# Patient Record
Sex: MALE | Race: WHITE | NOT HISPANIC OR LATINO | Employment: FULL TIME | URBAN - NONMETROPOLITAN AREA
[De-identification: names, ages, dates, MRNs, and addresses within clinical notes are randomized per-mention and may not be internally consistent; named-entity substitution may affect disease eponyms.]

---

## 2017-03-28 ENCOUNTER — OFFICE VISIT (OUTPATIENT)
Dept: FAMILY MEDICINE CLINIC | Facility: CLINIC | Age: 32
End: 2017-03-28

## 2017-03-28 VITALS
RESPIRATION RATE: 15 BRPM | SYSTOLIC BLOOD PRESSURE: 116 MMHG | HEIGHT: 73 IN | TEMPERATURE: 98.3 F | HEART RATE: 79 BPM | BODY MASS INDEX: 22.13 KG/M2 | OXYGEN SATURATION: 100 % | WEIGHT: 167 LBS | DIASTOLIC BLOOD PRESSURE: 77 MMHG

## 2017-03-28 DIAGNOSIS — G47.34 NOCTURNAL HYPOXEMIA: ICD-10-CM

## 2017-03-28 DIAGNOSIS — N52.8 OTHER MALE ERECTILE DYSFUNCTION: Primary | ICD-10-CM

## 2017-03-28 DIAGNOSIS — Z00.00 HEALTH CARE MAINTENANCE: ICD-10-CM

## 2017-03-28 DIAGNOSIS — R53.82 CHRONIC FATIGUE: ICD-10-CM

## 2017-03-28 DIAGNOSIS — Z87.891 FORMER SMOKER, STOPPED SMOKING IN DISTANT PAST: ICD-10-CM

## 2017-03-28 PROCEDURE — 99204 OFFICE O/P NEW MOD 45 MIN: CPT | Performed by: INTERNAL MEDICINE

## 2017-03-28 RX ORDER — TRIAMCINOLONE ACETONIDE 55 UG/1
2 SPRAY, METERED NASAL DAILY
COMMUNITY

## 2017-03-28 NOTE — PATIENT INSTRUCTIONS
Chronic Fatigue Syndrome  Chronic fatigue syndrome (CFS) is a condition in which there is lasting, extreme tiredness (fatigue) that does not improve with rest. CFS affects women up to four times more often than men. If you have CFS, fatigue and other symptoms can make it hard for you to get through your day. There is no treatment or cure. You will need to work closely with your health care provider to come up with a treatment plan that works for you.  CAUSES   No one knows what causes CFS. It may be triggered by a flu-like illness or by mono. Other triggers may include:  · An abnormal immune system.  · Low blood pressure.  · Poor diet.  · Physical or emotional stress.  SIGNS AND SYMPTOMS  The main symptom is fatigue that lasts all day, especially after physical or mental stress. Other common symptoms include:  · An extreme loss of energy with no obvious cause.  · Muscle or joint soreness.  · Severe weakness.  · Frequent headaches.  · Fever.  · Sore throat.  · Swollen lymph glands.  · Sleep is not refreshing.  · Loss of concentration or memory.  Less common symptoms may include:  · Chills.  · Night sweats.  · Tingling or numbness.  · Blurred vision.  · Dizziness.  · Sensitivity to noise or odors.  · Mood swings.  · Anxiety, panic attacks, and depression.  Your symptoms may come and go, or you may have them all the time.  DIAGNOSIS   There are no tests that can help health care providers diagnose CFS. It may take a long time for you to get a correct diagnosis. Your health care provider may need to do a number of tests to rule out other conditions that could be causing your symptoms.  You may be diagnosed with CFS if:  · You have fatigue that has lasted for at least six months.  · Your fatigue is not relieved by rest.  · Your fatigue is not caused by another condition.  · Your fatigue is severe enough to interfere with work and daily activities.  · You have at least four common symptoms of CFS.  TREATMENT   There is no  cure for CFS at this time. The condition affects everyone differently. You will need to work with your health care provider to find the best treatment for your symptoms.  Treatment may include:  · Improving sleep with a regular bedtime routine.  · Avoiding caffeine, alcohol, and tobacco.  · Doing light exercise and stretching during the day.  · Taking medicine to help you sleep.  · Taking over-the-counter medicines to relieve joint or muscle pain.  · Learning and practicing relaxation techniques.  · Using memory aids or doing brain teasers to improve memory and concentration.  · Seeing a mental health professional to evaluate and treat depression, if necessary.  · Trying massage therapy, acupuncture, and movement exercises, like yoga or fina chi.  HOME CARE INSTRUCTIONS  Work closely with your health care provider to follow your treatment plan at home. You may need to make major lifestyle changes.  If treatment does not seem to help, get a second opinion. You may get help from many health care providers, including doctors, mental health specialists, physical therapists, and rehabilitation therapists. Having the support of friends and loved ones is also important.  SEEK MEDICAL CARE IF:  · Your symptoms are not responding to treatment.  · You are having strong feelings of anger, guilt, anxiety, or depression.     This information is not intended to replace advice given to you by your health care provider. Make sure you discuss any questions you have with your health care provider.     Document Released: 01/25/2006 Document Revised: 01/13/2017 Document Reviewed: 11/07/2014  [employer] Interactive Patient Education ©2016 [employer]

## 2017-03-28 NOTE — PROGRESS NOTES
Fatigue for a couple months. Irritable. Gets sufficient amount of sleep. Snores at night. Stressed about life making ends meet. Financial problems.     Erectile dysfunction. Duration is effected.     CBC, CMP, Testosterone levels

## 2017-03-28 NOTE — PROGRESS NOTES
Chief Complaint   Patient presents with   • Establish Care     Patient is here to establish care today. Patient states he is having issues with erectile dysfunction, irrritablility and fatigue.        Subjective     History of Present Illness   Toni Uriarte is a 31 y.o. male with no significant prior past medical history who presents to establish care and address particular concerns of progressively worsening fatigue with associated erectile dysfunction and irritability for the last 3 months.  Patient does not recall having any major life changes or illness that may have triggered this event.  He states that he lives a fairly stable and happy life.  Patient denies any significant smoking, alcohol, or drug use.  Patient does admit to having difficulty with sleep and possibly having some snoring noticed by his wife.  He does notice that he wakes up with some energy but within a few hours feels like he could easily fall asleep again.    The following portions of the patient's history were reviewed and updated as appropriate: allergies, current medications, past family history, past medical history, past social history, past surgical history and problem list.    Review of Systems   Constitutional: Positive for fatigue. Negative for chills and fever.   HENT: Negative for congestion, ear pain, rhinorrhea, sinus pressure and sore throat.    Eyes: Negative for visual disturbance.   Respiratory: Negative for cough, chest tightness, shortness of breath and wheezing.    Cardiovascular: Negative for chest pain, palpitations and leg swelling.   Gastrointestinal: Negative for abdominal pain, blood in stool, constipation, diarrhea, nausea and vomiting.   Endocrine: Negative for polydipsia and polyuria.   Genitourinary: Negative for dysuria and hematuria.   Musculoskeletal: Negative for back pain.   Skin: Negative for rash.   Neurological: Negative for dizziness, light-headedness, numbness and headaches.   Psychiatric/Behavioral:  "Negative for dysphoric mood and sleep disturbance. The patient is not nervous/anxious.        Allergies   Allergen Reactions   • Ceclor [Cefaclor]    • Penicillins        Past Medical History:   Diagnosis Date   • Erectile dysfunction        Social History     Social History   • Marital status:      Spouse name: N/A   • Number of children: N/A   • Years of education: N/A     Occupational History   • Not on file.     Social History Main Topics   • Smoking status: Former Smoker     Quit date: 10/28/2006   • Smokeless tobacco: Not on file   • Alcohol use Yes   • Drug use: No   • Sexual activity: Not on file     Other Topics Concern   • Not on file     Social History Narrative   • No narrative on file        Past Surgical History:   Procedure Laterality Date   • VASECTOMY         Family History   Problem Relation Age of Onset   • Diverticulitis Mother    • Hyperlipidemia Father    • Cancer Paternal Uncle    • Colon cancer Paternal Uncle    • Heart disease Paternal Grandmother    • Lung disease Paternal Grandmother          Current Outpatient Prescriptions:   •  Triamcinolone Acetonide (NASACORT ALLERGY 24HR) 55 MCG/ACT nasal inhaler, 2 sprays into each nostril Daily., Disp: , Rfl:     Objective   /77 (BP Location: Right arm, Patient Position: Sitting, Cuff Size: Adult)  Pulse 79  Temp 98.3 °F (36.8 °C) (Oral)   Resp 15  Ht 73\" (185.4 cm)  Wt 167 lb (75.8 kg)  SpO2 100%  BMI 22.03 kg/m2    Physical Exam   Constitutional: He is oriented to person, place, and time. He appears well-nourished. No distress.   HENT:   Head: Atraumatic.   Right Ear: External ear normal.   Left Ear: External ear normal.   Eyes: Conjunctivae are normal. Right eye exhibits no discharge. Left eye exhibits no discharge.   Cardiovascular: Normal rate and regular rhythm.    No murmur heard.  Pulmonary/Chest: Effort normal and breath sounds normal. He has no wheezes. He has no rales.   Abdominal: Soft. Bowel sounds are normal. He " exhibits no distension. There is no tenderness.   Neurological: He is alert and oriented to person, place, and time.   Skin: No rash noted. He is not diaphoretic.   Psychiatric: He has a normal mood and affect.   Nursing note and vitals reviewed.      Assessment/Plan   Toni was seen today for [facility].    Diagnoses and all orders for this visit:    Other male erectile dysfunction  -     Testosterone, Free, Total; Future    Chronic fatigue  -     CBC & Differential  -     Comprehensive Metabolic Panel  -     Lipid Panel  -     Hemoglobin A1c  -     Vitamin B12  -     TSH  -     Vitamin D 25 Hydroxy  -     Urinalysis w/ Microscopic if Indicated; Future    Former smoker, stopped smoking in distant past    Health care maintenance  -     CBC & Differential  -     Comprehensive Metabolic Panel  -     Lipid Panel    Nocturnal hypoxemia  -     Overnight Sleep Oximetry Study; Future          Discussion Summary:    31-year-old white male presenting to establish care.    1.  Right fatigue/nocturnal hypoxemia  - Check labs per above.  Check overnight sleep oximetry study as overnight hypoxia may be a contributing factor.    2.  Erectile dysfunction  - check labs per above, consider starting medications after review of labs.    3.  Former smoker  -Patient congratulated and encouraged to stay off of smoking.    4.  Healthcare maintenance  -Review further at later visit.    Follow up:  Return in about 1 month (around 4/28/2017) for Next scheduled follow up.     Patient Instructions:  Patient instructions were provided.

## 2017-04-03 ENCOUNTER — RESULTS ENCOUNTER (OUTPATIENT)
Dept: FAMILY MEDICINE CLINIC | Facility: CLINIC | Age: 32
End: 2017-04-03

## 2017-04-03 DIAGNOSIS — N52.8 OTHER MALE ERECTILE DYSFUNCTION: ICD-10-CM

## 2017-04-04 LAB
CONV COMMENT: NORMAL
TESTOST FREE SERPL-MCNC: 15 PG/ML (ref 8.7–25.1)
TESTOST SERPL-MCNC: 672 NG/DL (ref 348–1197)

## 2017-04-26 ENCOUNTER — OFFICE VISIT (OUTPATIENT)
Dept: FAMILY MEDICINE CLINIC | Facility: CLINIC | Age: 32
End: 2017-04-26

## 2017-04-26 VITALS
RESPIRATION RATE: 16 BRPM | HEIGHT: 73 IN | DIASTOLIC BLOOD PRESSURE: 90 MMHG | TEMPERATURE: 98 F | WEIGHT: 167 LBS | SYSTOLIC BLOOD PRESSURE: 128 MMHG | BODY MASS INDEX: 22.13 KG/M2 | OXYGEN SATURATION: 100 % | HEART RATE: 80 BPM

## 2017-04-26 DIAGNOSIS — R53.83 OTHER FATIGUE: Primary | ICD-10-CM

## 2017-04-26 DIAGNOSIS — N52.8 OTHER MALE ERECTILE DYSFUNCTION: ICD-10-CM

## 2017-04-26 PROBLEM — N52.9 ERECTILE DYSFUNCTION: Status: ACTIVE | Noted: 2017-04-26

## 2017-04-26 LAB
25(OH)D3+25(OH)D2 SERPL-MCNC: 38.3 NG/ML
ALBUMIN SERPL-MCNC: 4.7 G/DL (ref 3.5–5)
ALBUMIN/GLOB SERPL: 1.8 G/DL (ref 1–2)
ALP SERPL-CCNC: 50 U/L (ref 38–126)
ALT SERPL-CCNC: 29 U/L (ref 13–69)
AST SERPL-CCNC: 26 U/L (ref 15–46)
BASOPHILS # BLD AUTO: 0.05 10*3/MM3 (ref 0–0.2)
BASOPHILS NFR BLD AUTO: 0.9 % (ref 0–2.5)
BILIRUB SERPL-MCNC: 0.8 MG/DL (ref 0.2–1.3)
BUN SERPL-MCNC: 13 MG/DL (ref 7–20)
BUN/CREAT SERPL: 13 (ref 6.3–21.9)
CALCIUM SERPL-MCNC: 10.1 MG/DL (ref 8.4–10.2)
CHLORIDE SERPL-SCNC: 104 MMOL/L (ref 98–107)
CHOLEST SERPL-MCNC: 157 MG/DL (ref 0–199)
CO2 SERPL-SCNC: 26 MMOL/L (ref 26–30)
CREAT SERPL-MCNC: 1 MG/DL (ref 0.6–1.3)
EOSINOPHIL # BLD AUTO: 0.14 10*3/MM3 (ref 0–0.7)
EOSINOPHIL NFR BLD AUTO: 2.5 % (ref 0–7)
ERYTHROCYTE [DISTWIDTH] IN BLOOD BY AUTOMATED COUNT: 12 % (ref 11.5–14.5)
GLOBULIN SER CALC-MCNC: 2.6 GM/DL
GLUCOSE SERPL-MCNC: 86 MG/DL (ref 74–98)
HBA1C MFR BLD: 5.2 %
HCT VFR BLD AUTO: 43 % (ref 42–52)
HDLC SERPL-MCNC: 45 MG/DL (ref 40–60)
HGB BLD-MCNC: 15.2 G/DL (ref 14–18)
IMM GRANULOCYTES # BLD: 0.01 10*3/MM3 (ref 0–0.06)
IMM GRANULOCYTES NFR BLD: 0.2 % (ref 0–0.6)
LDLC SERPL CALC-MCNC: 99 MG/DL (ref 0–99)
LYMPHOCYTES # BLD AUTO: 1.61 10*3/MM3 (ref 0.6–3.4)
LYMPHOCYTES NFR BLD AUTO: 28.2 % (ref 10–50)
MCH RBC QN AUTO: 34.1 PG (ref 27–31)
MCHC RBC AUTO-ENTMCNC: 35.3 G/DL (ref 30–37)
MCV RBC AUTO: 96.4 FL (ref 80–94)
MONOCYTES # BLD AUTO: 0.45 10*3/MM3 (ref 0–0.9)
MONOCYTES NFR BLD AUTO: 7.9 % (ref 0–12)
NEUTROPHILS # BLD AUTO: 3.45 10*3/MM3 (ref 2–6.9)
NEUTROPHILS NFR BLD AUTO: 60.3 % (ref 37–80)
NRBC BLD AUTO-RTO: 0 /100 WBC (ref 0–0)
PLATELET # BLD AUTO: 217 10*3/MM3 (ref 130–400)
POTASSIUM SERPL-SCNC: 4.1 MMOL/L (ref 3.5–5.1)
PROT SERPL-MCNC: 7.3 G/DL (ref 6.3–8.2)
RBC # BLD AUTO: 4.46 10*6/MM3 (ref 4.7–6.1)
SODIUM SERPL-SCNC: 144 MMOL/L (ref 137–145)
TRIGL SERPL-MCNC: 64 MG/DL
TSH SERPL DL<=0.005 MIU/L-ACNC: 2.33 MIU/ML (ref 0.47–4.68)
VIT B12 SERPL-MCNC: 500 PG/ML (ref 239–931)
VLDLC SERPL CALC-MCNC: 12.8 MG/DL
WBC # BLD AUTO: 5.71 10*3/MM3 (ref 4.8–10.8)

## 2017-04-26 PROCEDURE — 99213 OFFICE O/P EST LOW 20 MIN: CPT | Performed by: INTERNAL MEDICINE

## 2017-04-26 NOTE — PATIENT INSTRUCTIONS
Salt Water Gargle  This solution will help make your mouth and throat feel better.  HOME CARE INSTRUCTIONS   · Mix 1 teaspoon of salt in 8 ounces of warm water.  · Gargle with this solution as much or often as you need or as directed. Swish and gargle gently if you have any sores or wounds in your mouth.  · Do not swallow this mixture.     This information is not intended to replace advice given to you by your health care provider. Make sure you discuss any questions you have with your health care provider.     Document Released: 09/21/2005 Document Revised: 03/11/2013 Document Reviewed: 02/12/2010  [employer] Interactive Patient Education ©2016 [employer]      Chronic Fatigue Syndrome  Chronic fatigue syndrome (CFS) is a condition in which there is lasting, extreme tiredness (fatigue) that does not improve with rest. CFS affects women up to four times more often than men. If you have CFS, fatigue and other symptoms can make it hard for you to get through your day. There is no treatment or cure. You will need to work closely with your health care provider to come up with a treatment plan that works for you.  CAUSES   No one knows what causes CFS. It may be triggered by a flu-like illness or by mono. Other triggers may include:  · An abnormal immune system.  · Low blood pressure.  · Poor diet.  · Physical or emotional stress.  SIGNS AND SYMPTOMS  The main symptom is fatigue that lasts all day, especially after physical or mental stress. Other common symptoms include:  · An extreme loss of energy with no obvious cause.  · Muscle or joint soreness.  · Severe weakness.  · Frequent headaches.  · Fever.  · Sore throat.  · Swollen lymph glands.  · Sleep is not refreshing.  · Loss of concentration or memory.  Less common symptoms may include:  · Chills.  · Night sweats.  · Tingling or numbness.  · Blurred vision.  · Dizziness.  · Sensitivity to noise or odors.  · Mood swings.  · Anxiety, panic attacks, and  depression.  Your symptoms may come and go, or you may have them all the time.  DIAGNOSIS   There are no tests that can help health care providers diagnose CFS. It may take a long time for you to get a correct diagnosis. Your health care provider may need to do a number of tests to rule out other conditions that could be causing your symptoms.  You may be diagnosed with CFS if:  · You have fatigue that has lasted for at least six months.  · Your fatigue is not relieved by rest.  · Your fatigue is not caused by another condition.  · Your fatigue is severe enough to interfere with work and daily activities.  · You have at least four common symptoms of CFS.  TREATMENT   There is no cure for CFS at this time. The condition affects everyone differently. You will need to work with your health care provider to find the best treatment for your symptoms.  Treatment may include:  · Improving sleep with a regular bedtime routine.  · Avoiding caffeine, alcohol, and tobacco.  · Doing light exercise and stretching during the day.  · Taking medicine to help you sleep.  · Taking over-the-counter medicines to relieve joint or muscle pain.  · Learning and practicing relaxation techniques.  · Using memory aids or doing brain teasers to improve memory and concentration.  · Seeing a mental health professional to evaluate and treat depression, if necessary.  · Trying massage therapy, acupuncture, and movement exercises, like yoga or fina chi.  HOME CARE INSTRUCTIONS  Work closely with your health care provider to follow your treatment plan at home. You may need to make major lifestyle changes.  If treatment does not seem to help, get a second opinion. You may get help from many health care providers, including doctors, mental health specialists, physical therapists, and rehabilitation therapists. Having the support of friends and loved ones is also important.  SEEK MEDICAL CARE IF:  · Your symptoms are not responding to treatment.  · You  are having strong feelings of anger, guilt, anxiety, or depression.     This information is not intended to replace advice given to you by your health care provider. Make sure you discuss any questions you have with your health care provider.     Document Released: 01/25/2006 Document Revised: 01/13/2017 Document Reviewed: 11/07/2014  [employer] Interactive Patient Education ©2016 [employer]

## 2017-04-26 NOTE — PROGRESS NOTES
Chief Complaint   Patient presents with   • Follow-up     Patient is here for follow for erectile dysfunction. Patient states he feels like he is getting a cold. PHQ-9 complete.    • Nasal Congestion     Patient states he is having a lot of sinus drainage and irrated throat.    • Fatigue     x 3 days.        Subjective     History of Present Illness   Toni Uriarte is a 31 y.o. male presenting for follow-up on fatigue, erectile dysfunction, and allergies.  Nasal symptoms are well controlled with Nasacort.  Patient does share that he feels his fatigue is due to his abnormal sleep schedule.  He works second shift at [employer].  Returns home at about 2 AM.  He would go to sleep for a few hours and then wake up to take his kids to school and then go back to sleep at 8 AM.  He has been doing this for several years and notes that this may be a cause of his fatigue.    Patient also complains of 3 days URI Symptoms with Post nasal drip, dry cough, itchy scratchy throat.      Paternal uncle  of colon cancer in 43.  Maternal uncle diagnosed with colon cancer at age 60.     The following portions of the patient's history were reviewed and updated as appropriate: allergies, current medications, past family history, past medical history, past social history, past surgical history and problem list.    Review of Systems   Constitutional: Positive for fatigue. Negative for chills and fever.   HENT: Negative for congestion, ear pain, rhinorrhea, sinus pressure and sore throat.    Eyes: Negative for visual disturbance.   Respiratory: Negative for cough, chest tightness, shortness of breath and wheezing.    Cardiovascular: Negative for chest pain, palpitations and leg swelling.   Gastrointestinal: Negative for abdominal pain, blood in stool, constipation, diarrhea, nausea and vomiting.   Endocrine: Negative for polydipsia and polyuria.   Genitourinary: Negative for dysuria and hematuria.   Musculoskeletal: Negative for back pain.  "  Skin: Negative for rash.   Neurological: Negative for dizziness, light-headedness, numbness and headaches.   Psychiatric/Behavioral: Positive for sleep disturbance. Negative for dysphoric mood. The patient is not nervous/anxious.        Allergies   Allergen Reactions   • Ceclor [Cefaclor]    • Penicillins        Past Medical History:   Diagnosis Date   • Erectile dysfunction        Social History     Social History   • Marital status:      Spouse name: N/A   • Number of children: N/A   • Years of education: N/A     Occupational History   • Not on file.     Social History Main Topics   • Smoking status: Former Smoker     Quit date: 10/28/2006   • Smokeless tobacco: Not on file   • Alcohol use Yes   • Drug use: No   • Sexual activity: Not on file     Other Topics Concern   • Not on file     Social History Narrative   • No narrative on file        Past Surgical History:   Procedure Laterality Date   • VASECTOMY         Family History   Problem Relation Age of Onset   • Diverticulitis Mother    • Hyperlipidemia Father    • Cancer Paternal Uncle    • Colon cancer Paternal Uncle    • Heart disease Paternal Grandmother    • Lung disease Paternal Grandmother          Current Outpatient Prescriptions:   •  Triamcinolone Acetonide (NASACORT ALLERGY 24HR) 55 MCG/ACT nasal inhaler, 2 sprays into each nostril Daily., Disp: , Rfl:     Objective   /90 (BP Location: Left arm, Patient Position: Sitting, Cuff Size: Adult)  Pulse 80  Temp 98 °F (36.7 °C) (Oral)   Resp 16  Ht 73\" (185.4 cm)  Wt 167 lb (75.8 kg)  SpO2 100%  BMI 22.03 kg/m2    Physical Exam   Constitutional: He is oriented to person, place, and time. He appears well-developed and well-nourished.   HENT:   Head: Normocephalic and atraumatic.   Eyes: Conjunctivae are normal.   Pulmonary/Chest: Effort normal.   Musculoskeletal: Normal range of motion.   Neurological: He is alert and oriented to person, place, and time.   Psychiatric: He has a normal " mood and affect. His behavior is normal.   Nursing note and vitals reviewed.      PHQ-9 Depression Screening 4/26/2017   Little interest or pleasure in doing things 0   Feeling down, depressed, or hopeless 0   Trouble falling or staying asleep, or sleeping too much 0   Feeling tired or having little energy 1   Poor appetite or overeating 0   Feeling bad about yourself - or that you are a failure or have let yourself or your family down 0   Trouble concentrating on things, such as reading the newspaper or watching television 0   Moving or speaking so slowly that other people could have noticed. Or the opposite - being so fidgety or restless that you have been moving around a lot more than usual 0   Thoughts that you would be better off dead, or of hurting yourself in some way 0   PHQ-9 Total Score 1   If you checked off any problems, how difficult have these problems made it for you to do your work, take care of things at home, or get along with other people? Not difficult at all         Assessment/Plan   Toni was seen today for follow-up, nasal congestion and fatigue.    Diagnoses and all orders for this visit:    Other fatigue    Other male erectile dysfunction      Discussion Summary:   30 yo W M presenting for follow up.    1. Fatigue / Erectile Dysfunction  - testosterone was obtained and WNL but other labs did not get drawn. Will draw today as pt is fasting.  - poor sleep with 2nd shift schedule is contributing    2. URI  - likely viral, salt water gargle, nasal rinse, cont nasocort.    3. Poor Sleep schedule  - discussed ways to maintain better and longer sleep, increasing exercise, and melatonin PRN.       Follow up:  No Follow-up on file.     Patient Instructions:  Patient instructions were provided.

## 2017-10-18 ENCOUNTER — OFFICE VISIT (OUTPATIENT)
Dept: FAMILY MEDICINE CLINIC | Facility: CLINIC | Age: 32
End: 2017-10-18

## 2017-10-18 VITALS
WEIGHT: 169 LBS | TEMPERATURE: 97.7 F | OXYGEN SATURATION: 100 % | HEART RATE: 76 BPM | HEIGHT: 73 IN | SYSTOLIC BLOOD PRESSURE: 112 MMHG | BODY MASS INDEX: 22.4 KG/M2 | DIASTOLIC BLOOD PRESSURE: 74 MMHG

## 2017-10-18 DIAGNOSIS — R10.31 GROIN PAIN, CHRONIC, RIGHT: Primary | ICD-10-CM

## 2017-10-18 DIAGNOSIS — G89.29 GROIN PAIN, CHRONIC, RIGHT: Primary | ICD-10-CM

## 2017-10-18 PROCEDURE — 99214 OFFICE O/P EST MOD 30 MIN: CPT | Performed by: INTERNAL MEDICINE

## 2017-10-18 RX ORDER — IBUPROFEN 200 MG
200 TABLET ORAL EVERY 6 HOURS PRN
COMMUNITY

## 2017-10-18 NOTE — PROGRESS NOTES
Chief Complaint   Patient presents with   • Pelvic Pain     right sided (sharp pain) x 5 days; patient denies any vomiting; patient had one epsisode of diarrhea; denies any urinary symptoms       Subjective     History of Present Illness   Toni Uriarte is a 32 y.o. male presenting with intermittent episodes of right groin pain and swelling.  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  He lifted a heavy stack and shortly developed a sharp pain in the groin.  Had work up which was negative with US of testicles. Was eventually told he had groin strain.  Months later he developed pain in the perineal area.  Patient saw Urology - Dr. Poole and was told that his prostate was mildly inflamed.  He was treated with long course of antibiotics and had some improvement (2014).  Since this time, he has had intermittent perineal pain and right groin pain with swelling sensation which has worsened in frequency.     The following portions of the patient's history were reviewed and updated as appropriate: allergies, current medications, past family history, past medical history, past social history, past surgical history and problem list.    Review of Systems   Constitutional: Negative for chills, fatigue and fever.   HENT: Negative for congestion, ear pain, rhinorrhea, sinus pressure and sore throat.    Eyes: Negative for visual disturbance.   Respiratory: Negative for cough, chest tightness, shortness of breath and wheezing.    Cardiovascular: Negative for chest pain, palpitations and leg swelling.   Gastrointestinal: Negative for abdominal pain, blood in stool, constipation, diarrhea, nausea and vomiting.   Endocrine: Negative for polydipsia and polyuria.   Genitourinary: Positive for testicular pain. Negative for dysuria and hematuria.   Musculoskeletal: Negative for back pain.   Skin: Negative for rash.   Neurological: Negative for dizziness, light-headedness, numbness and headaches.  "  Psychiatric/Behavioral: Negative for dysphoric mood and sleep disturbance. The patient is not nervous/anxious.        Allergies   Allergen Reactions   • Ceclor [Cefaclor]    • Penicillins        Past Medical History:   Diagnosis Date   • Erectile dysfunction        Social History     Social History   • Marital status:      Spouse name: N/A   • Number of children: N/A   • Years of education: N/A     Occupational History   • Not on file.     Social History Main Topics   • Smoking status: Former Smoker     Quit date: 10/28/2006   • Smokeless tobacco: Not on file   • Alcohol use Yes   • Drug use: No   • Sexual activity: Not on file     Other Topics Concern   • Not on file     Social History Narrative        Past Surgical History:   Procedure Laterality Date   • VASECTOMY         Family History   Problem Relation Age of Onset   • Diverticulitis Mother    • Hyperlipidemia Father    • Cancer Paternal Uncle    • Colon cancer Paternal Uncle    • Heart disease Paternal Grandmother    • Lung disease Paternal Grandmother          Current Outpatient Prescriptions:   •  ibuprofen (ADVIL,MOTRIN) 200 MG tablet, Take 200 mg by mouth Every 6 (Six) Hours As Needed for Mild Pain ., Disp: , Rfl:   •  Triamcinolone Acetonide (NASACORT ALLERGY 24HR) 55 MCG/ACT nasal inhaler, 2 sprays into each nostril Daily., Disp: , Rfl:     Objective   /74 (BP Location: Left arm, Patient Position: Sitting)  Pulse 76  Temp 97.7 °F (36.5 °C)  Ht 73\" (185.4 cm)  Wt 169 lb (76.7 kg)  SpO2 100%  BMI 22.3 kg/m2    Physical Exam   Constitutional: He is oriented to person, place, and time. He appears well-developed and well-nourished.   HENT:   Head: Normocephalic and atraumatic.   Eyes: Conjunctivae are normal.   Pulmonary/Chest: Effort normal.   Musculoskeletal: Normal range of motion.   Neurological: He is alert and oriented to person, place, and time.   Psychiatric: He has a normal mood and affect. His behavior is normal.   Nursing note " and vitals reviewed.      Assessment/Plan   Toni was seen today for pelvic pain.    Diagnoses and all orders for this visit:    Groin pain, chronic, right  -     US scrotum and testicles; Future  -     Ambulatory Referral to Urology        Discussion Summary:    31-year-old white male presenting for follow-up on groin pain.  As the patient has followed with urology in the past, referral was placed for Dr. Poole to review the patient's case.  It is possible patient has a hernia which is becoming more prevalent.  Ultrasound has been ordered to evaluate.  Given concerns of possible prostatitis causing perineal pain, patient would benefit from further evaluation with urology.    Follow up:  Return in about 6 weeks (around 11/29/2017) for Next scheduled follow up.     Patient Instructions:  Patient instructions were provided.

## 2017-10-18 NOTE — PATIENT INSTRUCTIONS
Inguinal Hernia, Adult  An inguinal hernia is when fat or the intestines push through the area where the leg meets the lower abdomen (groin) and create a rounded lump (bulge). This condition develops over time. There are three types of inguinal hernias. These types include:  · Hernias that can be pushed back into the belly (are reducible).  · Hernias that are not reducible (are incarcerated).  · Hernias that are not reducible and lose their blood supply (are strangulated). This type of hernia requires emergency surgery.  CAUSES  This condition is caused by having a weak spot in the muscles or tissue. This weakness lets the hernia poke through. This condition can be triggered by:  · Suddenly straining the muscles of the lower abdomen.  · Lifting heavy objects.  · Straining to have a bowel movement. Difficult bowel movements (constipation) can lead to this.  · Coughing.  RISK FACTORS  This condition is more likely to develop in:  · Men.  · Pregnant women.  · People who:    Are overweight.    Work in jobs that require long periods of standing or heavy lifting.    Have had an inguinal hernia before.    Smoke or have lung disease. These factors can lead to long-lasting (chronic) coughing.  SYMPTOMS  Symptoms can depend on the size of the hernia. Often, a small inguinal hernia has no symptoms. Symptoms of a larger hernia include:  · A lump in the groin. This is easier to see when the person is standing. It might not be visible when he or she is lying down.  · Pain or burning in the groin. This occurs especially when lifting, straining, or coughing.  · A dull ache or a feeling of pressure in the groin.  · A lump in the scrotum in men.  Symptoms of a strangulated inguinal hernia can include:  · A bulge in the groin that is very painful and tender to the touch.  · A bulge that turns red or purple.  · Fever, nausea, and vomiting.  · The inability to have a bowel movement or to pass gas.  DIAGNOSIS  This condition is diagnosed  with a medical history and physical exam. Your health care provider may feel your groin area and ask you to cough.  TREATMENT  Treatment for this condition varies depending on the size of your hernia and whether you have symptoms. If you do not have symptoms, your health care provider may have you watch your hernia carefully and come in for follow-up visits. If your hernia is larger or if you have symptoms, your treatment will include surgery.  HOME CARE INSTRUCTIONS  Lifestyle  · Drink enough fluid to keep your urine clear or pale yellow.  · Eat a diet that includes a lot of fiber. Eat plenty of fruits, vegetables, and whole grains. Talk with your health care provider if you have questions.  · Avoid lifting heavy objects.  · Avoid standing for long periods of time.  · Do not use tobacco products, including cigarettes, chewing tobacco, or e-cigarettes. If you need help quitting, ask your health care provider.  · Maintain a healthy weight.  General Instructions  · Do not try to force the hernia back in.  · Watch your hernia for any changes in color or size. Let your health care provider know if any changes occur.  · Take over-the-counter and prescription medicines only as told by your health care provider.  · Keep all follow-up visits as told by your health care provider. This is important.  SEEK MEDICAL CARE IF:  · You have a fever.  · You have new symptoms.  · Your symptoms get worse.  SEEK IMMEDIATE MEDICAL CARE IF:  · You have pain in the groin that suddenly gets worse.  · A bulge in the groin gets bigger suddenly and does not go down.  · You are a man and you have a sudden pain in the scrotum, or the size of your scrotum suddenly changes.  · A bulge in the groin area becomes red or purple and is painful to the touch.  · You have nausea or vomiting that does not go away.  · You feel your heart beating a lot more quickly than normal.  · You cannot have a bowel movement or pass gas.     This information is not  intended to replace advice given to you by your health care provider. Make sure you discuss any questions you have with your health care provider.     Document Released: 05/06/2010 Document Revised: 04/10/2017 Document Reviewed: 06/20/2016  Elsevier Interactive Patient Education ©2017 [employer]

## 2017-10-19 ENCOUNTER — HOSPITAL ENCOUNTER (OUTPATIENT)
Dept: ULTRASOUND IMAGING | Facility: HOSPITAL | Age: 32
Discharge: HOME | End: 2017-10-19
Attending: INTERNAL MEDICINE | Admitting: INTERNAL MEDICINE

## 2017-10-19 DIAGNOSIS — R10.31 GROIN PAIN, CHRONIC, RIGHT: ICD-10-CM

## 2017-10-19 DIAGNOSIS — G89.29 GROIN PAIN, CHRONIC, RIGHT: ICD-10-CM

## 2017-10-19 PROCEDURE — 76870 US EXAM SCROTUM: CPT

## 2019-09-17 ENCOUNTER — OFFICE VISIT (OUTPATIENT)
Dept: INTERNAL MEDICINE | Facility: CLINIC | Age: 34
End: 2019-09-17

## 2019-09-17 VITALS
BODY MASS INDEX: 23.33 KG/M2 | DIASTOLIC BLOOD PRESSURE: 72 MMHG | TEMPERATURE: 97.9 F | HEIGHT: 73 IN | SYSTOLIC BLOOD PRESSURE: 116 MMHG | HEART RATE: 73 BPM | OXYGEN SATURATION: 100 % | WEIGHT: 176 LBS

## 2019-09-17 DIAGNOSIS — F32.1 CURRENT MODERATE EPISODE OF MAJOR DEPRESSIVE DISORDER WITHOUT PRIOR EPISODE (HCC): Primary | ICD-10-CM

## 2019-09-17 PROCEDURE — 99214 OFFICE O/P EST MOD 30 MIN: CPT | Performed by: INTERNAL MEDICINE

## 2019-09-17 RX ORDER — TRAZODONE HYDROCHLORIDE 50 MG/1
50 TABLET ORAL NIGHTLY
Qty: 30 TABLET | Refills: 2 | Status: SHIPPED | OUTPATIENT
Start: 2019-09-17 | End: 2019-10-17 | Stop reason: SDUPTHER

## 2019-09-17 NOTE — PROGRESS NOTES
Chief Complaint   Patient presents with   • Anxiety     symptoms are causing insomnia       Subjective     History of Present Illness   Toni Uriarte is a 34 y.o. male presenting for follow up  with concerns for anxiety and depression.  Patient states that over the last several months, he has lost interest in activities used to enjoy, he has limited sleep due to racing thoughts.  He shares that his wife was dealing with brain cancer, although she is now in remission, he still has thoughts which worry him.  He has been dealing with stressors at work as he will currently works at [employer] and his pain scale is changing.  In addition to this, his wife got into a car accident and since that time, he has been dealing with insurance issues.  With all of these things plus needing to take care of his children, he shares he is stressed and is unable to rest as a result.    The following portions of the patient's history were reviewed and updated as appropriate: allergies, current medications, past family history, past medical history, past social history, past surgical history and problem list.    Review of Systems   Constitutional: Negative for chills, fatigue and fever.   HENT: Negative for congestion, ear pain, rhinorrhea, sinus pressure and sore throat.    Eyes: Negative for visual disturbance.   Respiratory: Negative for cough, chest tightness, shortness of breath and wheezing.    Cardiovascular: Negative for chest pain, palpitations and leg swelling.   Gastrointestinal: Negative for abdominal pain, blood in stool, constipation, diarrhea, nausea and vomiting.   Endocrine: Negative for polydipsia and polyuria.   Genitourinary: Negative for dysuria and hematuria.   Musculoskeletal: Negative for arthralgias and back pain.   Skin: Negative for rash.   Neurological: Negative for dizziness, light-headedness, numbness and headaches.   Psychiatric/Behavioral: Positive for dysphoric mood. Negative for sleep disturbance. The patient  "is not nervous/anxious.        Allergies   Allergen Reactions   • Ceclor [Cefaclor]    • Penicillins        Past Medical History:   Diagnosis Date   • Erectile dysfunction        Social History     Socioeconomic History   • Marital status:      Spouse name: Not on file   • Number of children: Not on file   • Years of education: Not on file   • Highest education level: Not on file   Tobacco Use   • Smoking status: Former Smoker     Last attempt to quit: 10/28/2006     Years since quittin.8   Substance and Sexual Activity   • Alcohol use: Yes   • Drug use: No        Past Surgical History:   Procedure Laterality Date   • VASECTOMY         Family History   Problem Relation Age of Onset   • Diverticulitis Mother    • Hyperlipidemia Father    • Cancer Paternal Uncle    • Colon cancer Paternal Uncle    • Heart disease Paternal Grandmother    • Lung disease Paternal Grandmother          Current Outpatient Medications:   •  ibuprofen (ADVIL,MOTRIN) 200 MG tablet, Take 200 mg by mouth Every 6 (Six) Hours As Needed for Mild Pain ., Disp: , Rfl:   •  Triamcinolone Acetonide (NASACORT ALLERGY 24HR) 55 MCG/ACT nasal inhaler, 2 sprays into each nostril Daily., Disp: , Rfl:   •  traZODone (DESYREL) 50 MG tablet, Take 1 tablet by mouth Every Night., Disp: 30 tablet, Rfl: 2    Objective   /72   Pulse 73   Temp 97.9 °F (36.6 °C)   Ht 185.4 cm (73\")   Wt 79.8 kg (176 lb)   SpO2 100%   BMI 23.22 kg/m²     Physical Exam   Constitutional: He is oriented to person, place, and time. He appears well-developed and well-nourished.   HENT:   Head: Normocephalic and atraumatic.   Mildly reddened eyes bilaterally   Eyes: Conjunctivae are normal.   Neck: Normal range of motion. Neck supple.   Pulmonary/Chest: Effort normal.   Musculoskeletal: Normal range of motion.   Neurological: He is alert and oriented to person, place, and time.   Skin: No rash noted.   Psychiatric: His behavior is normal.   Dysphoric mood   Nursing " note and vitals reviewed.      Assessment/Plan   Toni was seen today for anxiety.    Diagnoses and all orders for this visit:    Current moderate episode of major depressive disorder without prior episode (CMS/[facility])  -     traZODone (DESYREL) 50 MG tablet; Take 1 tablet by mouth Every Night.      Discussion Summary:  Patient is a 34 y.o. male presenting for follow up with concerns of major depressive disorder.  Start trazodone with the hopes that this would also help the patient sleep and control depression to symptoms through the daytime.  Risks and benefits of the medications were discussed in great detail.  Patient was agreeable to trying the medication.  He is also interested in insulin.  A list of local counselors was provided for the patient.        Follow up:  Return in about 1 month (around 10/17/2019) for Next scheduled follow up.

## 2019-09-17 NOTE — PATIENT INSTRUCTIONS
Major Depressive Disorder, Adult  Major depressive disorder (MDD) is a mental health condition. MDD often makes you feel sad, hopeless, or helpless. MDD can also cause symptoms in your body. MDD can affect your:  · Work.  · School.  · Relationships.  · Other normal activities.  MDD can range from mild to very bad. It may occur once (single episode MDD). It can also occur many times (recurrent MDD).  The main symptoms of MDD often include:  · Feeling sad, depressed, or irritable most of the time.  · Loss of interest.  MDD symptoms also include:  · Sleeping too much or too little.  · Eating too much or too little.  · A change in your weight.  · Feeling tired (fatigue) or having low energy.  · Feeling worthless.  · Feeling guilty.  · Trouble making decisions.  · Trouble thinking clearly.  · Thoughts of suicide or harming others.  · Feeling weak.  · Feeling agitated.  · Keeping yourself from being around other people (isolation).  Follow these instructions at home:  Activity  · Do these things as told by your doctor:  ? Go back to your normal activities.  ? Exercise regularly.  ? Spend time outdoors.  Alcohol  · Talk with your doctor about how alcohol can affect your antidepressant medicines.  · Do not drink alcohol. Or, limit how much alcohol you drink.  ? This means no more than 1 drink a day for nonpregnant women and 2 drinks a day for men. One drink equals one of these:  § 12 oz of beer.  § 5 oz of wine.  § 1½ oz of hard liquor.  General instructions  · Take over-the-counter and prescription medicines only as told by your doctor.  · Eat a healthy diet.  · Get plenty of sleep.  · Find activities that you enjoy. Make time to do them.  · Think about joining a support group. Your doctor may be able to suggest a group for you.  · Keep all follow-up visits as told by your doctor. This is important.  Where to find more information:  · National [place] on Mental Illness:  ? www.govind.org  · [place] National [place] of Mental  Health:  ? www.[facility].gov  · National Suicide Prevention Lifeline:  ? 3-892-204-9014. This is free, 24-hour help.  Contact a doctor if:  · Your symptoms get worse.  · You have new symptoms.  Get help right away if:  · You self-harm.  · You see, hear, taste, smell, or feel things that are not present (hallucinate).  If you ever feel like you may hurt yourself or others, or have thoughts about taking your own life, get help right away. You can go to your nearest emergency department or call:  · Your local emergency services (911 in the [place]  · A suicide crisis helpline, such as the National Suicide Prevention Lifeline:  ? 1-373-069-7387. This is open 24 hours a day.  This information is not intended to replace advice given to you by your health care provider. Make sure you discuss any questions you have with your health care provider.  Document Released: 11/28/2016 Document Revised: 09/03/2017 Document Reviewed: 09/03/2017  Elsevier Interactive Patient Education © 2019 Elsevier Inc.

## 2019-10-17 ENCOUNTER — OFFICE VISIT (OUTPATIENT)
Dept: INTERNAL MEDICINE | Facility: CLINIC | Age: 34
End: 2019-10-17

## 2019-10-17 VITALS
BODY MASS INDEX: 23.19 KG/M2 | HEIGHT: 73 IN | TEMPERATURE: 97.4 F | OXYGEN SATURATION: 100 % | DIASTOLIC BLOOD PRESSURE: 74 MMHG | SYSTOLIC BLOOD PRESSURE: 128 MMHG | WEIGHT: 175 LBS | HEART RATE: 70 BPM

## 2019-10-17 DIAGNOSIS — J30.2 SEASONAL ALLERGIES: ICD-10-CM

## 2019-10-17 DIAGNOSIS — F32.1 CURRENT MODERATE EPISODE OF MAJOR DEPRESSIVE DISORDER WITHOUT PRIOR EPISODE (HCC): Primary | ICD-10-CM

## 2019-10-17 PROCEDURE — 99214 OFFICE O/P EST MOD 30 MIN: CPT | Performed by: INTERNAL MEDICINE

## 2019-10-17 RX ORDER — TRAZODONE HYDROCHLORIDE 100 MG/1
100 TABLET ORAL NIGHTLY
Qty: 30 TABLET | Refills: 2 | Status: SHIPPED | OUTPATIENT
Start: 2019-10-17 | End: 2019-10-24 | Stop reason: SDUPTHER

## 2019-10-17 NOTE — PROGRESS NOTES
Chief Complaint   Patient presents with   • Follow-up     insomnia; patient stated that he doesnt see any improvement with his sleep       Subjective     History of Present Illness   Toni Uriarte is a 34 y.o. male presenting for follow up.  He shares that his depression/anxiety symptoms are 50% better.  His sleep remains poor however.  He does not feel the trazodone is helping with sleep at all.   He has been suffering with chronic allergies since childhood.  He has tried nasal sprays and allergy medications with limited benefit.  He wishes to see a specialist for further work up.     The following portions of the patient's history were reviewed and updated as appropriate: allergies, current medications, past family history, past medical history, past social history, past surgical history and problem list.    Review of Systems   Constitutional: Negative for chills, fatigue and fever.   HENT: Negative for congestion, ear pain, rhinorrhea, sinus pressure and sore throat.    Eyes: Negative for visual disturbance.   Respiratory: Negative for cough, chest tightness, shortness of breath and wheezing.    Cardiovascular: Negative for chest pain, palpitations and leg swelling.   Gastrointestinal: Negative for abdominal pain, blood in stool, constipation, diarrhea, nausea and vomiting.   Endocrine: Negative for polydipsia and polyuria.   Genitourinary: Negative for dysuria and hematuria.   Musculoskeletal: Negative for arthralgias and back pain.   Skin: Negative for rash.   Neurological: Negative for dizziness, light-headedness, numbness and headaches.   Psychiatric/Behavioral: Negative for dysphoric mood and sleep disturbance. The patient is not nervous/anxious.        Allergies   Allergen Reactions   • Ceclor [Cefaclor]    • Penicillins        Past Medical History:   Diagnosis Date   • Erectile dysfunction        Social History     Socioeconomic History   • Marital status:      Spouse name: Not on file   • Number of  "children: Not on file   • Years of education: Not on file   • Highest education level: Not on file   Tobacco Use   • Smoking status: Former Smoker     Last attempt to quit: 10/28/2006     Years since quittin.9   Substance and Sexual Activity   • Alcohol use: Yes   • Drug use: No        Past Surgical History:   Procedure Laterality Date   • VASECTOMY         Family History   Problem Relation Age of Onset   • Diverticulitis Mother    • Hyperlipidemia Father    • Cancer Paternal Uncle    • Colon cancer Paternal Uncle    • Heart disease Paternal Grandmother    • Lung disease Paternal Grandmother          Current Outpatient Medications:   •  ibuprofen (ADVIL,MOTRIN) 200 MG tablet, Take 200 mg by mouth Every 6 (Six) Hours As Needed for Mild Pain ., Disp: , Rfl:   •  traZODone (DESYREL) 100 MG tablet, Take 1 tablet by mouth Every Night., Disp: 30 tablet, Rfl: 2  •  Triamcinolone Acetonide (NASACORT ALLERGY 24HR) 55 MCG/ACT nasal inhaler, 2 sprays into each nostril Daily., Disp: , Rfl:     Objective   /74   Pulse 70   Temp 97.4 °F (36.3 °C)   Ht 185.4 cm (73\")   Wt 79.4 kg (175 lb)   SpO2 100%   BMI 23.09 kg/m²     Physical Exam   Constitutional: He is oriented to person, place, and time. He appears well-developed and well-nourished.   HENT:   Head: Normocephalic and atraumatic.   Eyes: Conjunctivae are normal.   Neck: Normal range of motion. Neck supple.   Pulmonary/Chest: Effort normal.   Musculoskeletal: Normal range of motion.   Neurological: He is alert and oriented to person, place, and time.   Skin: No rash noted.   Psychiatric: He has a normal mood and affect. His behavior is normal.   Nursing note and vitals reviewed.      Assessment/Plan   Toni was seen today for follow-up.    Diagnoses and all orders for this visit:    Current moderate episode of major depressive disorder without prior episode (CMS/[facility])  -     traZODone (DESYREL) 100 MG tablet; Take 1 tablet by mouth Every Night.    Seasonal " allergies  -     Ambulatory Referral to Allergy          Discussion Summary:  Patient is a 34 y.o. male presenting for follow up.    1. MDD - improved but not controlled, increase trazodone to 100 mg po QHS.     2. Seasonal Allergies - uncontrolled by antihistamines and nasal sprays, allergy referral placed for testing.        Follow up:  Return in about 1 month (around 11/17/2019) for Annual physical.

## 2019-10-17 NOTE — PATIENT INSTRUCTIONS
Trazodone tablets  What is this medicine?  TRAZODONE (TRAZ oh done) is used to treat depression.  This medicine may be used for other purposes; ask your health care provider or pharmacist if you have questions.  COMMON BRAND NAME(S): Deni  What should I tell my health care provider before I take this medicine?  They need to know if you have any of these conditions:  -attempted suicide or thinking about it  -bipolar disorder  -bleeding problems  -glaucoma  -heart disease, or previous heart attack  -irregular heart beat  -kidney or liver disease  -low levels of sodium in the blood  -an unusual or allergic reaction to trazodone, other medicines, foods, dyes or preservatives  -pregnant or trying to get pregnant  -breast-feeding  How should I use this medicine?  Take this medicine by mouth with a glass of water. Follow the directions on the prescription label. Take this medicine shortly after a meal or a light snack. Take your medicine at regular intervals. Do not take your medicine more often than directed. Do not stop taking this medicine suddenly except upon the advice of your doctor. Stopping this medicine too quickly may cause serious side effects or your condition may worsen.  A special MedGuide will be given to you by the pharmacist with each prescription and refill. Be sure to read this information carefully each time.  Talk to your pediatrician regarding the use of this medicine in children. Special care may be needed.  Overdosage: If you think you have taken too much of this medicine contact a poison control center or emergency room at once.  NOTE: This medicine is only for you. Do not share this medicine with others.  What if I miss a dose?  If you miss a dose, take it as soon as you can. If it is almost time for your next dose, take only that dose. Do not take double or extra doses.  What may interact with this medicine?  Do not take this medicine with any of the following medications:  -certain medicines  for fungal infections like fluconazole, itraconazole, ketoconazole, posaconazole, voriconazole  -cisapride  -dofetilide  -dronedarone  -linezolid  -MAOIs like Carbex, Eldepryl, Marplan, Nardil, and Parnate  -mesoridazine  -methylene blue (injected into a vein)  -pimozide  -saquinavir  -thioridazine  This medicine may also interact with the following medications:  -alcohol  -antiviral medicines for HIV or AIDS  -aspirin and aspirin-like medicines  -barbiturates like phenobarbital  -certain medicines for blood pressure, heart disease, irregular heart beat  -certain medicines for depression, anxiety, or psychotic disturbances  -certain medicines for migraine headache like almotriptan, eletriptan, frovatriptan, naratriptan, rizatriptan, sumatriptan, zolmitriptan  -certain medicines for seizures like carbamazepine and phenytoin  -certain medicines for sleep  -certain medicines that treat or prevent blood clots like dalteparin, enoxaparin, warfarin  -digoxin  -fentanyl  -lithium  -NSAIDS, medicines for pain and inflammation, like ibuprofen or naproxen  -other medicines that prolong the QT interval (cause an abnormal heart rhythm)  -rasagiline  -supplements like Larissa's wort, kava kava, valerian  -tramadol  -tryptophan  This list may not describe all possible interactions. Give your health care provider a list of all the medicines, herbs, non-prescription drugs, or dietary supplements you use. Also tell them if you smoke, drink alcohol, or use illegal drugs. Some items may interact with your medicine.  What should I watch for while using this medicine?  Tell your doctor if your symptoms do not get better or if they get worse. Visit your doctor or health care professional for regular checks on your progress. Because it may take several weeks to see the full effects of this medicine, it is important to continue your treatment as prescribed by your doctor.  Patients and their families should watch out for new or worsening  thoughts of suicide or depression. Also watch out for sudden changes in feelings such as feeling anxious, agitated, panicky, irritable, hostile, aggressive, impulsive, severely restless, overly excited and hyperactive, or not being able to sleep. If this happens, especially at the beginning of treatment or after a change in dose, call your health care professional.  You may get drowsy or dizzy. Do not drive, use machinery, or do anything that needs mental alertness until you know how this medicine affects you. Do not stand or sit up quickly, especially if you are an older patient. This reduces the risk of dizzy or fainting spells. Alcohol may interfere with the effect of this medicine. Avoid alcoholic drinks.  This medicine may cause dry eyes and blurred vision. If you wear contact lenses you may feel some discomfort. Lubricating drops may help. See your eye doctor if the problem does not go away or is severe.  Your mouth may get dry. Chewing sugarless gum, sucking hard candy and drinking plenty of water may help. Contact your doctor if the problem does not go away or is severe.  What side effects may I notice from receiving this medicine?  Side effects that you should report to your doctor or health care professional as soon as possible:  -allergic reactions like skin rash, itching or hives, swelling of the face, lips, or tongue  -elevated mood, decreased need for sleep, racing thoughts, impulsive behavior  -confusion  -fast, irregular heartbeat  -feeling faint or lightheaded, falls  -feeling agitated, angry, or irritable  -loss of balance or coordination  -painful or prolonged erections  -restlessness, pacing, inability to keep still  -suicidal thoughts or other mood changes  -tremors  -trouble sleeping  -seizures  -unusual bleeding or bruising  Side effects that usually do not require medical attention (report to your doctor or health care professional if they continue or are bothersome):  -change in sex drive or  performance  -change in appetite or weight  -constipation  -headache  -muscle aches or pains  -nausea  This list may not describe all possible side effects. Call your doctor for medical advice about side effects. You may report side effects to FDA at 0-465-BTX-9909.  Where should I keep my medicine?  Keep out of the reach of children.  Store at room temperature between 15 and 30 degrees C (59 to 86 degrees F). Protect from light. Keep container tightly closed. Throw away any unused medicine after the expiration date.  NOTE: This sheet is a summary. It may not cover all possible information. If you have questions about this medicine, talk to your doctor, pharmacist, or health care provider.  © 2019 [employer] (2019-02-26 17:51:24)

## 2019-10-24 DIAGNOSIS — F32.1 CURRENT MODERATE EPISODE OF MAJOR DEPRESSIVE DISORDER WITHOUT PRIOR EPISODE (HCC): ICD-10-CM

## 2019-10-24 RX ORDER — TRAZODONE HYDROCHLORIDE 100 MG/1
100 TABLET ORAL NIGHTLY
Qty: 30 TABLET | Refills: 2 | Status: SHIPPED | OUTPATIENT
Start: 2019-10-24

## 2019-10-24 NOTE — TELEPHONE ENCOUNTER
Pt left a VM today at 12:45pm stating [facility] had not received his RX for trazodone for the newer dose, this was sent to [facility] so I have resent to [facility] per pts request on VM.

## 2025-05-29 ENCOUNTER — OUTSIDE FACILITY SERVICE (OUTPATIENT)
Dept: CARDIOLOGY | Facility: CLINIC | Age: 40
End: 2025-05-29
Payer: COMMERCIAL